# Patient Record
Sex: MALE | Race: ASIAN | URBAN - METROPOLITAN AREA
[De-identification: names, ages, dates, MRNs, and addresses within clinical notes are randomized per-mention and may not be internally consistent; named-entity substitution may affect disease eponyms.]

---

## 2022-08-19 ENCOUNTER — WEB ENCOUNTER (OUTPATIENT)
Dept: URBAN - METROPOLITAN AREA CLINIC 42 | Facility: CLINIC | Age: 47
End: 2022-08-19

## 2022-08-19 ENCOUNTER — OFFICE VISIT (OUTPATIENT)
Dept: URBAN - METROPOLITAN AREA CLINIC 42 | Facility: CLINIC | Age: 47
End: 2022-08-19
Payer: COMMERCIAL

## 2022-08-19 ENCOUNTER — DASHBOARD ENCOUNTERS (OUTPATIENT)
Age: 47
End: 2022-08-19

## 2022-08-19 VITALS
SYSTOLIC BLOOD PRESSURE: 145 MMHG | BODY MASS INDEX: 26.36 KG/M2 | TEMPERATURE: 97.1 F | DIASTOLIC BLOOD PRESSURE: 96 MMHG | RESPIRATION RATE: 20 BRPM | HEART RATE: 91 BPM | WEIGHT: 164 LBS | HEIGHT: 66 IN

## 2022-08-19 DIAGNOSIS — Z12.11 SCREENING FOR COLON CANCER: ICD-10-CM

## 2022-08-19 PROCEDURE — 99202 OFFICE O/P NEW SF 15 MIN: CPT | Performed by: INTERNAL MEDICINE

## 2022-08-19 NOTE — HPI-TODAY'S VISIT:
The patient is a 45 yo male who is referred by Dr. Zachary Mckeon for a colonoscopy.  The patient had a colonoscopy 15 years ago that was normal for rectal bleeding.  NO current GI symptoms.  No family hx of colon CA.

## 2022-08-23 ENCOUNTER — OFFICE VISIT (OUTPATIENT)
Dept: URBAN - METROPOLITAN AREA SURGERY CENTER 13 | Facility: SURGERY CENTER | Age: 47
End: 2022-08-23
Payer: COMMERCIAL

## 2022-08-23 DIAGNOSIS — Z12.11 COLON CANCER SCREENING: ICD-10-CM

## 2022-08-23 PROCEDURE — 45378 DIAGNOSTIC COLONOSCOPY: CPT | Performed by: INTERNAL MEDICINE

## 2022-08-23 PROCEDURE — G8907 PT DOC NO EVENTS ON DISCHARG: HCPCS | Performed by: INTERNAL MEDICINE

## 2024-06-06 NOTE — PHYSICAL EXAM CHEST:
Patient presents for zarxio injection today.     I am an RN. Does the patient have an active anti-cancer treatment plan? (oral, injecti on, or IV) Yes.   Regimen: carbo taxol keytruda  Cycle/Day: [id number]    ECOG:   ECOG [06/06/24 1507]   ECOG Performance Status 0       Oral Chemotherapy: No  Nursing Assessment:  A comprehensive nursing assessment was performed and the patient reports the following:    Anxiety/Depression/Insomnia: Anxiety: No, Depression: No, and Insomnia: No  Pain: NO    Toxicity Assessment    Auditory/Ear  Assessment: Yes (Within Defined Limits)    Cardiac General  Assessment: Yes (w/ Exceptions to WDL)  Hypertension: Grade 1    Constitutional  Assessment: Yes (Within Defined Limits)    Dermatology/Skin  Assessment: Yes (w/ Exceptions to WDL)  Alopecia: Grade 1    Endocrine  Assessment: Yes (Within Defined Limits)    Gastrointestinal  Assessment: Yes (Within Defined Limits)  Diarrhea: Grade 1 (green, gave supplies for stool sample)    Hemorrhage/Bleeding  Assessment: Yes (Within Defined Limits)    Infection  Assessment: Yes (Within Defined Limits)    Lymphatics  Assessment: Yes (Within Defined Limits)    Musculoskeletal  Assessment: Yes (Within Defined Limits)    Neurology  Assessment: Yes (Within Defined Limits)  Depression: Grade 2  Anxiety: Grade 2    Ocular  Assessment: Yes (w/ Exceptions to WDL)  Eye Disorders: Grade 2    Pain  Assessment: Yes (Within Defined Limits)    Pulmonary/Upper Respiratory  Assessment: Yes (Within Defined Limits)    Genitourinary  Assessment: Yes (Within Defined Limits)        Patient has valid pre-authorization, VS completed, and Patient instructed to call the office with any questions or concerns    Reviewed and verified Advanced Directives: Yes: Advance Directive is in chart     Pre-Injection Information: Allergies reviewed as required for injection type., Pt states feeling well, no complaints., and Pt denies signs and symptoms of infection.    Refer to MAR  chest wall non-tender, breathing is unlabored without accessory muscle use, normal breath sounds (medication administration record) for type of injection and medication given.  Needle Size: per   Patient tolerated well: Stable and Follow up appointment scheduled    Dr. Maria M Cisneros   is supervising clinician today.    Future Appointments   Date Time Provider Department Center   6/7/2024  1:45 PM SDT [facility] [address]   6/12/2024 11:00 AM SDT [facility] [address]   6/12/2024 11:15 AM Keri Guerrero MD [address]   9/10/2024 10:30 AM Gaby Ceballos MD [facility] [place]